# Patient Record
Sex: FEMALE | Race: BLACK OR AFRICAN AMERICAN | NOT HISPANIC OR LATINO | URBAN - METROPOLITAN AREA
[De-identification: names, ages, dates, MRNs, and addresses within clinical notes are randomized per-mention and may not be internally consistent; named-entity substitution may affect disease eponyms.]

---

## 2024-03-28 ENCOUNTER — OFFICE VISIT (OUTPATIENT)
Dept: FAMILY MEDICINE | Facility: CLINIC | Age: 54
End: 2024-03-28
Payer: COMMERCIAL

## 2024-03-28 VITALS
RESPIRATION RATE: 16 BRPM | SYSTOLIC BLOOD PRESSURE: 106 MMHG | HEIGHT: 62 IN | WEIGHT: 165 LBS | HEART RATE: 68 BPM | TEMPERATURE: 97.7 F | DIASTOLIC BLOOD PRESSURE: 68 MMHG | BODY MASS INDEX: 30.36 KG/M2 | OXYGEN SATURATION: 97 %

## 2024-03-28 DIAGNOSIS — Z12.31 VISIT FOR SCREENING MAMMOGRAM: ICD-10-CM

## 2024-03-28 DIAGNOSIS — Z11.4 SCREENING FOR HIV (HUMAN IMMUNODEFICIENCY VIRUS): ICD-10-CM

## 2024-03-28 DIAGNOSIS — Z11.59 NEED FOR HEPATITIS C SCREENING TEST: ICD-10-CM

## 2024-03-28 DIAGNOSIS — Z12.11 SCREEN FOR COLON CANCER: ICD-10-CM

## 2024-03-28 DIAGNOSIS — E56.9 VITAMIN DEFICIENCY: ICD-10-CM

## 2024-03-28 DIAGNOSIS — J98.01 BRONCHOSPASM: ICD-10-CM

## 2024-03-28 DIAGNOSIS — N39.0 FREQUENT UTI: ICD-10-CM

## 2024-03-28 DIAGNOSIS — Z13.29 SCREENING FOR THYROID DISORDER: ICD-10-CM

## 2024-03-28 DIAGNOSIS — Z13.1 SCREENING FOR DIABETES MELLITUS: ICD-10-CM

## 2024-03-28 DIAGNOSIS — E78.5 HYPERLIPIDEMIA LDL GOAL <100: Primary | ICD-10-CM

## 2024-03-28 LAB
ALBUMIN SERPL BCG-MCNC: 4.2 G/DL (ref 3.5–5.2)
ALBUMIN UR-MCNC: NEGATIVE MG/DL
ALP SERPL-CCNC: 89 U/L (ref 40–150)
ALT SERPL W P-5'-P-CCNC: 14 U/L (ref 0–50)
ANION GAP SERPL CALCULATED.3IONS-SCNC: 10 MMOL/L (ref 7–15)
APPEARANCE UR: CLEAR
AST SERPL W P-5'-P-CCNC: 19 U/L (ref 0–45)
BACTERIA #/AREA URNS HPF: ABNORMAL /HPF
BASOPHILS # BLD AUTO: 0 10E3/UL (ref 0–0.2)
BASOPHILS NFR BLD AUTO: 1 %
BILIRUB SERPL-MCNC: 0.2 MG/DL
BILIRUB UR QL STRIP: NEGATIVE
BUN SERPL-MCNC: 10.5 MG/DL (ref 6–20)
CALCIUM SERPL-MCNC: 9.7 MG/DL (ref 8.6–10)
CHLORIDE SERPL-SCNC: 102 MMOL/L (ref 98–107)
CHOLEST SERPL-MCNC: 179 MG/DL
COLOR UR AUTO: YELLOW
CREAT SERPL-MCNC: 0.58 MG/DL (ref 0.51–0.95)
DEPRECATED HCO3 PLAS-SCNC: 25 MMOL/L (ref 22–29)
EGFRCR SERPLBLD CKD-EPI 2021: >90 ML/MIN/1.73M2
EOSINOPHIL # BLD AUTO: 0.2 10E3/UL (ref 0–0.7)
EOSINOPHIL NFR BLD AUTO: 2 %
ERYTHROCYTE [DISTWIDTH] IN BLOOD BY AUTOMATED COUNT: 14 % (ref 10–15)
FASTING STATUS PATIENT QL REPORTED: YES
FASTING STATUS PATIENT QL REPORTED: YES
GLUCOSE SERPL-MCNC: 116 MG/DL (ref 70–99)
GLUCOSE SERPL-MCNC: 116 MG/DL (ref 70–99)
GLUCOSE UR STRIP-MCNC: NEGATIVE MG/DL
HBA1C MFR BLD: 5.8 % (ref 0–5.6)
HCT VFR BLD AUTO: 41.1 % (ref 35–47)
HCV AB SERPL QL IA: NONREACTIVE
HDLC SERPL-MCNC: 54 MG/DL
HGB BLD-MCNC: 13.3 G/DL (ref 11.7–15.7)
HGB UR QL STRIP: NEGATIVE
HIV 1+2 AB+HIV1 P24 AG SERPL QL IA: NONREACTIVE
IMM GRANULOCYTES # BLD: 0 10E3/UL
IMM GRANULOCYTES NFR BLD: 0 %
KETONES UR STRIP-MCNC: NEGATIVE MG/DL
LDLC SERPL CALC-MCNC: 113 MG/DL
LEUKOCYTE ESTERASE UR QL STRIP: ABNORMAL
LYMPHOCYTES # BLD AUTO: 1.9 10E3/UL (ref 0.8–5.3)
LYMPHOCYTES NFR BLD AUTO: 29 %
MCH RBC QN AUTO: 27 PG (ref 26.5–33)
MCHC RBC AUTO-ENTMCNC: 32.4 G/DL (ref 31.5–36.5)
MCV RBC AUTO: 83 FL (ref 78–100)
MONOCYTES # BLD AUTO: 0.5 10E3/UL (ref 0–1.3)
MONOCYTES NFR BLD AUTO: 8 %
NEUTROPHILS # BLD AUTO: 4 10E3/UL (ref 1.6–8.3)
NEUTROPHILS NFR BLD AUTO: 60 %
NITRATE UR QL: NEGATIVE
NONHDLC SERPL-MCNC: 125 MG/DL
PH UR STRIP: 5.5 [PH] (ref 5–7)
PLATELET # BLD AUTO: 294 10E3/UL (ref 150–450)
POTASSIUM SERPL-SCNC: 4.2 MMOL/L (ref 3.4–5.3)
PROT SERPL-MCNC: 7.6 G/DL (ref 6.4–8.3)
RBC # BLD AUTO: 4.93 10E6/UL (ref 3.8–5.2)
RBC #/AREA URNS AUTO: ABNORMAL /HPF
SODIUM SERPL-SCNC: 137 MMOL/L (ref 135–145)
SP GR UR STRIP: 1.02 (ref 1–1.03)
SQUAMOUS #/AREA URNS AUTO: ABNORMAL /LPF
TRIGL SERPL-MCNC: 59 MG/DL
TSH SERPL DL<=0.005 MIU/L-ACNC: 4.02 UIU/ML (ref 0.3–4.2)
UROBILINOGEN UR STRIP-ACNC: 0.2 E.U./DL
VIT B12 SERPL-MCNC: 571 PG/ML (ref 232–1245)
VIT D+METAB SERPL-MCNC: 14 NG/ML (ref 20–50)
WBC # BLD AUTO: 6.6 10E3/UL (ref 4–11)
WBC #/AREA URNS AUTO: ABNORMAL /HPF

## 2024-03-28 PROCEDURE — 36415 COLL VENOUS BLD VENIPUNCTURE: CPT | Performed by: INTERNAL MEDICINE

## 2024-03-28 PROCEDURE — 82306 VITAMIN D 25 HYDROXY: CPT | Performed by: INTERNAL MEDICINE

## 2024-03-28 PROCEDURE — 80053 COMPREHEN METABOLIC PANEL: CPT | Performed by: INTERNAL MEDICINE

## 2024-03-28 PROCEDURE — 82607 VITAMIN B-12: CPT | Performed by: INTERNAL MEDICINE

## 2024-03-28 PROCEDURE — 87389 HIV-1 AG W/HIV-1&-2 AB AG IA: CPT | Performed by: INTERNAL MEDICINE

## 2024-03-28 PROCEDURE — 84443 ASSAY THYROID STIM HORMONE: CPT | Performed by: INTERNAL MEDICINE

## 2024-03-28 PROCEDURE — 81001 URINALYSIS AUTO W/SCOPE: CPT | Performed by: INTERNAL MEDICINE

## 2024-03-28 PROCEDURE — 85025 COMPLETE CBC W/AUTO DIFF WBC: CPT | Performed by: INTERNAL MEDICINE

## 2024-03-28 PROCEDURE — 83036 HEMOGLOBIN GLYCOSYLATED A1C: CPT | Performed by: INTERNAL MEDICINE

## 2024-03-28 PROCEDURE — 99203 OFFICE O/P NEW LOW 30 MIN: CPT | Performed by: INTERNAL MEDICINE

## 2024-03-28 PROCEDURE — 86803 HEPATITIS C AB TEST: CPT | Performed by: INTERNAL MEDICINE

## 2024-03-28 PROCEDURE — 80061 LIPID PANEL: CPT | Performed by: INTERNAL MEDICINE

## 2024-03-28 RX ORDER — METHYLPREDNISOLONE 4 MG
TABLET, DOSE PACK ORAL
Qty: 21 TABLET | Refills: 2 | Status: SHIPPED | OUTPATIENT
Start: 2024-03-28 | End: 2024-04-11

## 2024-03-28 ASSESSMENT — PAIN SCALES - GENERAL: PAINLEVEL: NO PAIN (0)

## 2024-03-28 NOTE — PROGRESS NOTES
Karsten Robison is a 54 year old, presenting for the following health issues:  Establish Care  History of Present Illness       Reason for visit:  Check up    She eats 2-3 servings of fruits and vegetables daily.She consumes 1 sweetened beverage(s) daily.She exercises with enough effort to increase her heart rate 30 to 60 minutes per day.  She exercises with enough effort to increase her heart rate 3 or less days per week.   She is taking medications regularly.           Current Medications:     No current outpatient medications on file.         Allergies:    No Known Allergies         Past Medical History:   No past medical history on file.      Past Surgical History:   No past surgical history on file.      Family Medical History:   No family history on file.      Social History:     Social History     Socioeconomic History    Marital status:      Spouse name: Not on file    Number of children: Not on file    Years of education: Not on file    Highest education level: Not on file   Occupational History    Not on file   Tobacco Use    Smoking status: Never    Smokeless tobacco: Never   Vaping Use    Vaping Use: Never used   Substance and Sexual Activity    Alcohol use: Not on file    Drug use: Not on file    Sexual activity: Not on file   Other Topics Concern    Not on file   Social History Narrative    Not on file     Social Determinants of Health     Financial Resource Strain: Low Risk  (3/28/2024)    Financial Resource Strain     Within the past 12 months, have you or your family members you live with been unable to get utilities (heat, electricity) when it was really needed?: No   Food Insecurity: Low Risk  (3/28/2024)    Food Insecurity     Within the past 12 months, did you worry that your food would run out before you got money to buy more?: No     Within the past 12 months, did the food you bought just not last and you didn t have money to get more?: No   Transportation Needs: Low Risk   "(3/28/2024)    Transportation Needs     Within the past 12 months, has lack of transportation kept you from medical appointments, getting your medicines, non-medical meetings or appointments, work, or from getting things that you need?: No   Physical Activity: Not on file   Stress: Not on file   Social Connections: Not on file   Interpersonal Safety: Not on file   Housing Stability: Low Risk  (3/28/2024)    Housing Stability     Do you have housing? : Yes     Are you worried about losing your housing?: No           Review of System:     Constitutional: Negative for fever or chills  Skin: Negative for rashes  Ears/Nose/Throat: Negative for nasal congestion, sore throat  Respiratory: positive for bronchospasm  Cardiovascular: Negative for chest pain  Gastrointestinal: Negative for nausea, vomiting  Genitourinary: Negative for dysuria, hematuria  Musculoskeletal: Negative for myalgias  Neurologic: Negative for headaches  Psychiatric: Negative for depression, anxiety  Hematologic/Lymphatic/Immunologic: Negative  Endocrine: Negative  Behavioral: Negative for tobacco use       Physical Exam:   /68 (BP Location: Right arm, Patient Position: Sitting, Cuff Size: Adult Large)   Pulse 68   Temp 97.7  F (36.5  C) (Oral)   Resp 16   Ht 1.575 m (5' 2\")   Wt 74.8 kg (165 lb)   LMP  (LMP Unknown)   SpO2 97%   Breastfeeding No   BMI 30.18 kg/m      GENERAL: alert and no distress  EYES: eyes grossly normal to inspection, and conjunctivae and sclerae normal  HENT: Normocephalic atraumatic. Nose and mouth without ulcers or lesions  NECK: supple  RESP: lungs clear to auscultation   CV: regular rate and rhythm, normal S1 S2  LYMPH: no peripheral edema   ABDOMEN: nondistended  MS: no gross musculoskeletal defects noted  SKIN: no suspicious lesions or rashes  NEURO: Alert & Oriented x 3.   PSYCH: mentation appears normal, affect normal        Diagnostic Test Results:     Diagnostic Test Results:  Labs reviewed in " Epic    ASSESSMENT/PLAN:       Enriqueta was seen today for establish care.    Diagnoses and all orders for this visit:    Hyperlipidemia LDL goal <100  -     CBC with platelets and differential; Future  -     Comprehensive metabolic panel (BMP + Alb, Alk Phos, ALT, AST, Total. Bili, TP); Future  -     Lipid panel reflex to direct LDL Fasting; Future    Visit for screening mammogram  -     MA SCREENING DIGITAL BILAT - Future  (s+30); Future    Screen for colon cancer  -     Colonoscopy Screening  Referral; Future    Screening for HIV (human immunodeficiency virus)  -     HIV Antigen Antibody Combo; Future    Need for hepatitis C screening test  -     Hepatitis C Screen Reflex to HCV RNA Quant and Genotype; Future    Screening for diabetes mellitus  -     Glucose; Future  -     Hemoglobin A1c; Future    Screening for thyroid disorder  -     TSH with free T4 reflex; Future    Vitamin deficiency  -     Vitamin D Deficiency; Future  -     Vitamin B12; Future    Frequent UTI  -     UA with Microscopic reflex to Culture - lab collect; Future    Bronchospasm  -     methylPREDNISolone (MEDROL DOSEPAK) 4 MG tablet therapy pack; Follow Package Directions    Other orders  -     REVIEW OF HEALTH MAINTENANCE PROTOCOL ORDERS            Follow Up Plan:     Patient is instructed to return to Internal Medicine clinic for follow-up visit in 1 month.        Rebeca Wolfe MD  Internal Medicine  [facility]

## 2024-04-11 ENCOUNTER — OFFICE VISIT (OUTPATIENT)
Dept: FAMILY MEDICINE | Facility: CLINIC | Age: 54
End: 2024-04-11
Payer: COMMERCIAL

## 2024-04-11 VITALS
HEIGHT: 65 IN | DIASTOLIC BLOOD PRESSURE: 60 MMHG | BODY MASS INDEX: 27.82 KG/M2 | OXYGEN SATURATION: 98 % | TEMPERATURE: 97.3 F | SYSTOLIC BLOOD PRESSURE: 109 MMHG | HEART RATE: 68 BPM | WEIGHT: 167 LBS | RESPIRATION RATE: 18 BRPM

## 2024-04-11 DIAGNOSIS — Z00.00 ROUTINE HISTORY AND PHYSICAL EXAMINATION OF ADULT: Primary | ICD-10-CM

## 2024-04-11 DIAGNOSIS — E66.3 OVERWEIGHT (BMI 25.0-29.9): ICD-10-CM

## 2024-04-11 DIAGNOSIS — E78.5 HYPERLIPIDEMIA LDL GOAL <100: ICD-10-CM

## 2024-04-11 DIAGNOSIS — E55.9 VITAMIN D DEFICIENCY: ICD-10-CM

## 2024-04-11 DIAGNOSIS — R73.01 IMPAIRED FASTING GLUCOSE: ICD-10-CM

## 2024-04-11 LAB
ABO/RH(D): NORMAL
SPECIMEN EXPIRATION DATE: NORMAL

## 2024-04-11 PROCEDURE — 86901 BLOOD TYPING SEROLOGIC RH(D): CPT | Performed by: INTERNAL MEDICINE

## 2024-04-11 PROCEDURE — 36415 COLL VENOUS BLD VENIPUNCTURE: CPT | Performed by: INTERNAL MEDICINE

## 2024-04-11 PROCEDURE — 99396 PREV VISIT EST AGE 40-64: CPT | Performed by: INTERNAL MEDICINE

## 2024-04-11 PROCEDURE — 86900 BLOOD TYPING SEROLOGIC ABO: CPT | Performed by: INTERNAL MEDICINE

## 2024-04-11 RX ORDER — VITAMIN B COMPLEX
1 TABLET ORAL DAILY
Qty: 90 TABLET | Refills: 3 | Status: SHIPPED | OUTPATIENT
Start: 2024-04-11

## 2024-04-11 SDOH — HEALTH STABILITY: PHYSICAL HEALTH: ON AVERAGE, HOW MANY DAYS PER WEEK DO YOU ENGAGE IN MODERATE TO STRENUOUS EXERCISE (LIKE A BRISK WALK)?: 3 DAYS

## 2024-04-11 ASSESSMENT — SOCIAL DETERMINANTS OF HEALTH (SDOH): HOW OFTEN DO YOU GET TOGETHER WITH FRIENDS OR RELATIVES?: TWICE A WEEK

## 2024-04-11 ASSESSMENT — PAIN SCALES - GENERAL: PAINLEVEL: NO PAIN (0)

## 2024-04-11 NOTE — PROGRESS NOTES
"Preventive Care Visit  [facility]  Rebeca Wolfe MD, Internal Medicine  Apr 11, 2024      Assessment & Plan     Vitamin D deficiency  - Vitamin D3 (CHOLECALCIFEROL) 25 mcg (1000 units) tablet  Dispense: 90 tablet; Refill: 3    Impaired fasting glucose  - diet, exercise    Hyperlipidemia LDL goal <100  - diet, exercise    Overweight (BMI 25.0-29.9)  - diet, exercise    Routine history and physical examination of adult        Patient has been advised of split billing requirements and indicates understanding: Yes        BMI  Estimated body mass index is 28.22 kg/m  as calculated from the following:    Height as of this encounter: 1.638 m (5' 4.5\").    Weight as of this encounter: 75.8 kg (167 lb).   Weight management plan: Discussed healthy diet and exercise guidelines    Counseling  Appropriate preventive services were discussed with this patient, including applicable screening as appropriate for fall prevention, nutrition, physical activity, Tobacco-use cessation, weight loss and cognition.  Checklist reviewing preventive services available has been given to the patient.  Reviewed patient's diet, addressing concerns and/or questions.   She is at risk for lack of exercise and has been provided with information to increase physical activity for the benefit of her well-being.       FUTURE APPOINTMENTS:       - Follow-up visit in 1 year    Karsten Robison is a 54 year old, presenting for the following:  Physical         Health Care Directive  Patient does not have a Health Care Directive or Living Will: Discussed advance care planning with patient; however, patient declined at this time.    HPI        4/11/2024   General Health   How would you rate your overall physical health? Excellent   Feel stress (tense, anxious, or unable to sleep) Not at all         4/11/2024   Nutrition   Three or more servings of calcium each day? Yes   Diet: Regular (no restrictions)   How many servings of fruit and " vegetables per day? (!) 2-3   How many sweetened beverages each day? (!) 2         4/11/2024   Exercise   Days per week of moderate/strenous exercise 3 days         4/11/2024   Social Factors   Frequency of gathering with friends or relatives Twice a week   Worry food won't last until get money to buy more No   Food not last or not have enough money for food? No   Do you have housing?  Yes   Are you worried about losing your housing? No   Lack of transportation? No   Unable to get utilities (heat,electricity)? No         4/11/2024   Fall Risk   Fallen 2 or more times in the past year? No   Trouble with walking or balance? No          4/11/2024   Dental   Dentist two times every year? Yes         4/11/2024   TB Screening   Were you born outside of the [place]? Yes         Today's PHQ-2 Score:       4/11/2024    10:59 AM   PHQ-2 ( 1999 [employer])   Q1: Little interest or pleasure in doing things 0   Q2: Feeling down, depressed or hopeless 0   PHQ-2 Score 0           4/11/2024   Substance Use   Alcohol more than 3/day or more than 7/wk No   Do you use any other substances recreationally? No     Social History     Tobacco Use    Smoking status: Never    Smokeless tobacco: Never   Vaping Use    Vaping status: Never Used          Mammogram Screening - Mammogram every 1-2 years updated in Health Maintenance based on mutual decision making        4/11/2024   STI Screening   New sexual partner(s) since last STI/HIV test? No     History of abnormal Pap smear: NO - age 30- 65 PAP every 3 years recommended       ASCVD Risk   The 10-year ASCVD risk score (Nina MARIE, et al., 2019) is: 1.4%    Values used to calculate the score:      Age: 54 years      Sex: Female      Is Non- : Yes      Diabetic: No      Tobacco smoker: No      Systolic Blood Pressure: 109 mmHg      Is BP treated: No      HDL Cholesterol: 54 mg/dL      Total Cholesterol: 179 mg/dL           Reviewed and updated as needed this visit by  "Provider                    No past medical history on file.      Review of Systems  Constitutional, HEENT, cardiovascular, pulmonary, GI, , musculoskeletal, neuro, skin, endocrine and psych systems are negative, except as otherwise noted.     Objective    Exam  /60 (BP Location: Left arm, Patient Position: Sitting, Cuff Size: Adult Large)   Pulse 68   Temp 97.3  F (36.3  C) (Oral)   Resp 18   Ht 1.638 m (5' 4.5\")   Wt 75.8 kg (167 lb)   LMP  (LMP Unknown)   SpO2 98%   Breastfeeding No   BMI 28.22 kg/m     Estimated body mass index is 28.22 kg/m  as calculated from the following:    Height as of this encounter: 1.638 m (5' 4.5\").    Weight as of this encounter: 75.8 kg (167 lb).    Physical Exam  GENERAL: alert and no distress  EYES: Eyes grossly normal to inspection,  conjunctivae and sclerae normal  HENT: normocephalic atraumatic  NECK: no asymmetry  RESP: lungs clear to auscultation  CV: regular rate and rhythm, normal S1 S2  ABDOMEN: soft, nontender, bowel sounds normal  MS: no gross musculoskeletal defects noted, no edema  SKIN: no suspicious lesions or rashes  NEURO: Normal strength and tone, mentation intact and speech normal  PSYCH: mentation appears normal, affect normal/bright        Signed Electronically by: Rebeca Wolfe MD    "

## 2025-03-12 ENCOUNTER — PATIENT OUTREACH (OUTPATIENT)
Dept: CARE COORDINATION | Facility: CLINIC | Age: 55
End: 2025-03-12
Payer: COMMERCIAL

## 2025-03-26 ENCOUNTER — PATIENT OUTREACH (OUTPATIENT)
Dept: CARE COORDINATION | Facility: CLINIC | Age: 55
End: 2025-03-26
Payer: COMMERCIAL

## 2025-04-10 ENCOUNTER — PATIENT OUTREACH (OUTPATIENT)
Dept: CARE COORDINATION | Facility: CLINIC | Age: 55
End: 2025-04-10
Payer: COMMERCIAL

## 2025-04-24 ENCOUNTER — PATIENT OUTREACH (OUTPATIENT)
Dept: CARE COORDINATION | Facility: CLINIC | Age: 55
End: 2025-04-24
Payer: COMMERCIAL